# Patient Record
Sex: MALE | Race: WHITE | NOT HISPANIC OR LATINO | Employment: PART TIME | URBAN - METROPOLITAN AREA
[De-identification: names, ages, dates, MRNs, and addresses within clinical notes are randomized per-mention and may not be internally consistent; named-entity substitution may affect disease eponyms.]

---

## 2022-05-20 ENCOUNTER — MEDICAL CORRESPONDENCE (OUTPATIENT)
Dept: HEALTH INFORMATION MANAGEMENT | Facility: CLINIC | Age: 77
End: 2022-05-20
Payer: MEDICARE

## 2022-05-23 ENCOUNTER — TRANSCRIBE ORDERS (OUTPATIENT)
Dept: OTHER | Age: 77
End: 2022-05-23

## 2022-05-23 DIAGNOSIS — D03.39 MELANOMA IN SITU OF NOSE (H): Primary | ICD-10-CM

## 2022-05-24 NOTE — TELEPHONE ENCOUNTER
FUTURE VISIT INFORMATION      FUTURE VISIT INFORMATION:    Date: 5.31.22    Time: 2:30 PM    Location:  Derm Surg  REFERRAL INFORMATION:    Referring provider:  Omero    Referring providers clinic:      Reason for visit/diagnosis  Left nasal sidewall melanoma    RECORDS REQUESTED FROM:       Clinic name Comments Records Status Imaging Status    5.11.22 Omero  Path Case: [id number]  Care Everywhere

## 2022-05-31 ENCOUNTER — PRE VISIT (OUTPATIENT)
Dept: DERMATOLOGY | Facility: CLINIC | Age: 77
End: 2022-05-31
Payer: MEDICARE

## 2022-05-31 ENCOUNTER — OFFICE VISIT (OUTPATIENT)
Dept: DERMATOLOGY | Facility: CLINIC | Age: 77
End: 2022-05-31
Payer: MEDICARE

## 2022-05-31 DIAGNOSIS — D03.39 MELANOMA IN SITU OF NOSE (H): Primary | ICD-10-CM

## 2022-05-31 PROCEDURE — 99204 OFFICE O/P NEW MOD 45 MIN: CPT | Mod: GC | Performed by: DERMATOLOGY

## 2022-05-31 RX ORDER — LORATADINE 10 MG/1
10 TABLET ORAL DAILY
COMMUNITY

## 2022-05-31 RX ORDER — APIXABAN 5 MG/1
TABLET, FILM COATED ORAL
COMMUNITY
Start: 2022-04-28

## 2022-05-31 ASSESSMENT — PAIN SCALES - GENERAL: PAINLEVEL: NO PAIN (0)

## 2022-05-31 NOTE — NURSING NOTE
Chief Complaint   Patient presents with     Derm Problem     L nasal sidewall melanoma      Kasandra SPRINGER, EMT  Dermatology/Dermatology Surgery  [phone]

## 2022-05-31 NOTE — PATIENT INSTRUCTIONS
Mohs Cutaneous Micrographic Surgery Unit  Ashvin Vazquez MD      Pre-Surgical Instruction Sheet    Expect to be here majority of the morning.  The Mohs surgical procedure can be an extensive surgery requiring multiple stages. Each stage may take 1-2 hours.    You may eat breakfast  You may bring snacks or beverages with you  Take all normal medications morning of surgery -- unless otherwise indicated by your physician  If you have an artificial heart valve, or joint replacement within two years, we will prescribe an antibiotic. Please take one hour prior to surgery.    You will need a  to be with you if your surgical site is close to your eyes. You can get swelling/bruising immediately after surgery and will go home with a big bulky bandage that could obstruct your view of driving safely.    Wear comfortable clothing -- preferably a button down shirt or a loose fitted shirt. (to avoid pulling over pressure bandage off when you get home) If your surgery site is on your leg, try wearing loose fitted pants. If your surgery site is on your arm, try wearing a short-sleeve shirt.    Bring reading material or other items to help pass time. We do have internet access available if you own a laptop, iPad, etc.)    Women - do NOT wear make-up. We will be washing it off anyone needing surgery on the face    Do NOT stop blood thinning medication unless instructed to do so by your surgeon. This will include: Warfarin, Coumadin, Jantoven, Aspirin, Plavix and Pradaxa. If you are taking Warfarin or Coumadin, please have your INR blood test results sent to our office no more than 7 days prior to your surgery.     Tylenol is a good alternative to take for headaches and pain, and can be taken throughout your surgery and postoperative recovery.    If your surgery is on your trunk, arms, hands, legs, feet, fingernail or a toenail, please wash the area with Hibiclens, an over-the-counter antiseptic soap, the night before and morning of  your surgery.     If you have any questions, please feel free to contact us.    Phone numbers:  During business hours (M-F 8:00-4:30 p.m.)  [facility]:  [phone] - select option 1 for appt. desk  [phone] - select option 3 for nurse triage line  [facility]:   [phone] - goes straight to call center   ---------------------------------------------------------  Evenings/Weekends/Holidays  Hospital - [phone]   TTY for hearing cuuqzhvl-[phone]  *Ask  to page dermatologist on-call  Emergency Uffq-[phone]  TTY for hearing impaired- [phone]

## 2022-05-31 NOTE — PROGRESS NOTES
Mohs Micrographic Surgery Consult Note    May 31, 2022    Dermatology Problem List:  1. Melanoma in situ, left nasal sidewall, pending MMS    Subjective: The patient is a 77 year old man who presents today for Mohs micrographic surgery consultation for a recent diagnosis of skin cancer.    Skin cancer(s): Melanoma in situ  Location(s): Left nasal sidewall  Associated symptoms: pruritus, tenderness to touch  Onset: within last 1 year    No other associated symptoms, modifying factors, or prior treatments, except when noted above. The patient denies any constitutional symptoms, lymphadenopathy, unintentional weight loss or decreased appetite. No other skin concerns today.    Objective:   Gen: This is a well appearing individual in no acute distress. The patient is alert and oriented x 3.  An exam of the face was performed today and visualized the following:  - At the left nasal sidewall, there is a poorly demarcated hyperpigmented, atrophic plaque corresponding to biopsied skin cancer as above.    Assessment and Plan:     1. Plan for Mohs micrographic surgery for skin cancer(s) above:  - We discussed the nature of the diagnosis/condition above. We discussed the treatment options, including the risks benefits and expectations of these options. We recommend micrographic surgery as the most effective and most tissue sparing option for treatment, and the patient agrees to proceed with this.  The patient is aware of the risks, benefits and expectations of this procedure. The patient will be scheduled for this procedure, if not already done so.  - We anticipate the following closure type: Sliding or lifting flap    The patient was discussed with and evaluated by attending physician, Ashvin Vazquez MD.    Pilo Maya MD  Micrographic Surgery and Dermatologic Oncology (MSDO) Fellow    Scribe Disclosure:  Shobha LUNA, am serving as a scribe to document services personally performed by Ashvin Vazquez MD based on data collection and  the provider's statements to me.

## 2022-05-31 NOTE — LETTER
5/31/2022       RE: Marcus Jiménez  [address] [address]     Dear Colleague,    Thank you for referring your patient, Marcus Jiménez, to the [facility] [place] at [facility]. Please see a copy of my visit note below.    Mohs Micrographic Surgery Consult Note    May 31, 2022    Dermatology Problem List:  1. Melanoma in situ, left nasal sidewall, pending MMS    Subjective: The patient is a 77 year old man who presents today for Mohs micrographic surgery consultation for a recent diagnosis of skin cancer.    Skin cancer(s): Melanoma in situ  Location(s): Left nasal sidewall  Associated symptoms: pruritus, tenderness to touch  Onset: within last 1 year    No other associated symptoms, modifying factors, or prior treatments, except when noted above. The patient denies any constitutional symptoms, lymphadenopathy, unintentional weight loss or decreased appetite. No other skin concerns today.    Objective:   Gen: This is a well appearing individual in no acute distress. The patient is alert and oriented x 3.  An exam of the face was performed today and visualized the following:  - At the left nasal sidewall, there is a poorly demarcated hyperpigmented, atrophic plaque corresponding to biopsied skin cancer as above.    Assessment and Plan:     1. Plan for Mohs micrographic surgery for skin cancer(s) above:  - We discussed the nature of the diagnosis/condition above. We discussed the treatment options, including the risks benefits and expectations of these options. We recommend micrographic surgery as the most effective and most tissue sparing option for treatment, and the patient agrees to proceed with this.  The patient is aware of the risks, benefits and expectations of this procedure. The patient will be scheduled for this procedure, if not already done so.  - We anticipate the following closure type: Sliding or  lifting flap    The patient was discussed with and evaluated by attending physician, Ashvin Vazquez MD.    Pilo Maya MD  Micrographic Surgery and Dermatologic Oncology (MSDO) Fellow    Scribe Disclosure:  I, Shobha Zaman, am serving as a scribe to document services personally performed by Ashvin Vazquez MD based on data collection and the provider's statements to me.       Attestation signed by Ashvin Vazquez MD at 6/7/2022  9:08 AM:  Attending Attestation  I attest that the Fellow recorded the interview and exam that I personally performed.  I have reviewed the note and edited it as necessary.    Ashvin Vazquez M.D.  Professor  Director of Dermatologic Surgery  Department of Dermatology  [facility]

## 2022-06-07 ENCOUNTER — OFFICE VISIT (OUTPATIENT)
Dept: DERMATOLOGY | Facility: CLINIC | Age: 77
End: 2022-06-07
Attending: DERMATOLOGY
Payer: MEDICARE

## 2022-06-07 VITALS — HEART RATE: 79 BPM | DIASTOLIC BLOOD PRESSURE: 97 MMHG | SYSTOLIC BLOOD PRESSURE: 158 MMHG

## 2022-06-07 DIAGNOSIS — D03.39 MELANOMA IN SITU OF NOSE (H): ICD-10-CM

## 2022-06-07 PROCEDURE — 88305 TISSUE EXAM BY PATHOLOGIST: CPT | Mod: 26 | Performed by: DERMATOLOGY

## 2022-06-07 PROCEDURE — 17311 MOHS 1 STAGE H/N/HF/G: CPT | Mod: GC | Performed by: DERMATOLOGY

## 2022-06-07 PROCEDURE — 88342 IMHCHEM/IMCYTCHM 1ST ANTB: CPT | Mod: TC | Performed by: DERMATOLOGY

## 2022-06-07 PROCEDURE — 88342 IMHCHEM/IMCYTCHM 1ST ANTB: CPT | Mod: 26 | Performed by: DERMATOLOGY

## 2022-06-07 PROCEDURE — 14061 TIS TRNFR E/N/E/L10.1-30SQCM: CPT | Mod: GC | Performed by: DERMATOLOGY

## 2022-06-07 NOTE — PATIENT INSTRUCTIONS
Wound care    I will experience scar (unavoidable when the skin is cut with surgery), and may experience bleeding, swelling, pain, crusting, and redness. I may experience incomplete removal (very uncommon) or recurrence (very uncommon). Risks of the procedure are bleeding, bruising, swelling, infection, nerve damage, and a large wound. These occur very uncommonly but are still theoretically possible.    Caring for your skin after surgery    After your surgery, a pressure bandage will be placed over the area that has stitches. This is important to prevent bleeding. Please follow these instructions over the next 1 to 2 weeks. Following this regimen will help to prevent complications as your wound heals and produce the best possible scar over time.    For the first 48 hours after your surgery:    Leave the pressure dressing on and keep it dry. If it should come loose, you may re-tape it, but do not take it off.  Relax and take it easy. Do not do any vigorous exercise or heavy lifting. This could cause the wound to bleed, sutures to break, and the wound to enlarge.  Post-operative pain is usually mild to moderate. Please see the detailed pain medication instructions at the bottom of this message regarding the use of Tylenol (acetaminophen) and ibuprofen.  Avoid alcohol as this may increase your tendency to bleed.  You may put an ice pack around the bandaged area for 20 minutes at a time as needed multiple times daily. This may help reduce swelling, bruising, and pain. Make sure the ice pack is waterproof so that the pressure bandage doesn t get wet.  If your surgical wound is on the face, try your best to sleep with your head elevated. Either in a recliner or propped up in bed with pillows as this will decrease swelling around the eyes, especially when surgery was on the upper face or mid-face.  You may see a small amount of drainage or blood on your pressure bandage. This is normal. However:  If drainage or bleeding  continues or saturates the bandage, you will need to apply firm pressure over the bandage with a piece of gauze, a clean towel, or a waterproof ice pack (an ice pack is best) for 20 minutes continuously without letting up the pressure early. If you let up the pressure earlier than 20 minutes, then the timer restarts.   If after applying continuous pressure for 20 minutes you check and still see active bleeding, then continue for an additional 20 minute period. Waterproof ice packs are best for applying pressure.  If bleeding still continues after two 20-minute periods, call our office or go to the nearest emergency room.    Remove pressure dressing 48 hours after surgery:    Carefully remove the pressure bandage. If it seems sticky or too difficult to get off, you may need to soak it with room temperature water, such as with wet compresses or in the shower.  After the pressure dressing is removed, you may shower and get the wound wet. However, do not let the forceful stream of the direct water pressure from the shower hit the wound directly. This will interfere with wound healing by knocking of skin cells that are migrating into the wound to heal the wound.  Follow these wound care and dressing change instructions:  In the shower, wash the surgical site LAST with its own separate clean wash cloth. Wash everything but the surgical site first, THEN wash the surgical site to avoid contamination from other body areas.  You may allow water to run over the site - just not shooting water on top of the surgical site. Take a clean wash cloth wet with soapy warm water and gently pat the suture site to help remove any crust or drainage that may develop.  Do not rub or scrub the site  After the site is clean, pat dry and apply a thin layer of Vaseline ointment over the suture site with a cotton swab or clean finger.   Cover the suture site with Telfa (non-stick) dressing. You may tape a piece of gauze over the Telfa for extra  "protection if you wish. If you do not have Telfa, then use a clean bandage that is large enough to cover the surgical site without sticky adhesive directly touching the wound or sutures, which would be painful to rip off.  Continue wound care at least once a day, or twice a day if you are active or around a dirty or contaminated environment for work.  Continue daily wound care until your surgical site is completely healed. To determine this, around 2 weeks post procedure, you can take a cotton swab with a small amount of hydrogen peroxide and roll it on the suture site. If the site bubbles and turns white, then your wound is still healing and has not completely sealed shut. Please continue wound care until the area no longer bubbles up from the hydrogen peroxide.  Regarding dissolving stitches: if you have been told your stitches are dissolving (also called \"absorbable sutures\"), they should dissolve in 1-2 weeks. If the absorbable/dissolving stitches do not dissolve by 1-2 weeks, you can use a Q-tip with hydrogen peroxide on it and roll it along the suture line to help the stitches dissolve and come out. Please give us a call if stitches are still present after two weeks. If you do not keep your wound moist at all times, such as with Vaseline or petroleum jelly, while it heals, the dissolving sutures will dry out and not dissolve. They need a moist environment in order to dissolve normally, which is why a coat of Vaseline is important every day.      Follow up is typically a 3-month scar evaluation appointment either in-person in the clinic, or via a telephone visit with you sending in a photo via Instab[employer]. This is unless you have been told to follow up sooner, or if you have concerns and would like to be see sooner for a post-operative issue. Please call or send us a Instabug message with a photo if possible. A Instabug message with a photo is usually the fastest way for us to be able to make an assessment of a " "post-operative issue.        What to expect:    The first couple of days your wound may be tender and may bleed slightly when doing wound care.  There may be swelling and bruising around the wound, especially if it is near the eyes. For surgeries of the upper face or mid-face, \"black eyes\" (bruising within the eye sockets) and swelling around the eyes is very common. For your comfort, you may apply ice or cold compresses to the bruises after your have removed the pressure bandage.  The area around your wound may be numb for several weeks or even months. Nerve endings are typically the slowest thing to regenerate and can take even up to 1 year to completely regenerate. Very, very rarely, patients can report permanent changes in their sensation after surgery.  You may experience periodic sharp pain or mild itching around the wound as it heals.  The suture line will look dark and raised for a while but will lighten and flatten over time. This can take up to 3-6 months.    For post-operative pain control:    If you are able to take acetaminophen (\"Tylenol,\" etc.) and ibuprofen (\"Advil\" or \"Motrin,\" etc.), then you may STAGGER these medications by taking 400 mg of ibuprofen (usually two tabs) every 8 hours and 1,000 mg of acetaminophen (e.g., two tabs of extra-strength Tylenol) every 8 hours.    This means, for example, that you could take the followin,000 mg of Tylenol, followed 4 hours later by 400 mg Ibuprofen, followed 4 hours later with 1,000 mg of Tylenol, followed 4 hours later by 400 mg Ibuprofen, followed 4 hours later with 1,000 mg of Tylenol, and so forth.     Essentially, you can take either 1,000 mg of Tylenol or 400 mg of ibuprofen in alternating fashion EVERY FOUR HOURS.    Do NOT exceed more than 4,000 mg of Tylenol or 3,200 mg of ibuprofen per 24 hours. If you are not able to take Tylenol or ibuprofen as above due to other health issues (or a physician has told you directly that you are not allowed " to take one of them, say due to pre-existing severe liver or kidney issues), then disregard the above directions.    Scientific evidence supports that this combination/schedule of pain medications is just as effective, if not more effective, than taking a narcotic pain medicine.      When to contact us:    You have bleeding that will not stop after applying pressure, including with waterproof ice packs, as above.  You have pain that is not controlled with Tylenol (acetaminophen) and/or ibuprofen.  You have signs or symptoms of an infection, such as:  Fever over 100 degrees Fahrenheit  Redness expanding half an inch past the surgery site, or foul-smelling drainage from the wound  If you have any follow-up questions, or are not sure how to take care of the wound.    Note that [employer] is often the fastest way to contact us, and we recommend registering for [employer] if you are able to and have technology access. You can register for [employer] by going to [employer] or calling [phone] and asking for help with registering.    Phone numbers:    During business hours (M-F 8:00-4:30 p.m.)  [facility]-  [phone] Option 1 appt. desk  [phone]  Option 3 nurse triage line  ---------------------------------------------------------  Evenings/Weekends/Holidays  Hospital - [phone]   TTY for hearing kujrjceq-[phone]  *Ask  to page dermatologist on-call  Emergency Hvpq-[phone]  TTY for hearing impaired- [phone]

## 2022-06-07 NOTE — NURSING NOTE
Chief Complaint   Patient presents with     Derm Problem     Patient is here today for mohs on left nasal side wall.      Jamila HANSEN CMA

## 2022-06-07 NOTE — PROGRESS NOTES
[facility] Mohs Surgery Procedure Note    Case #: [id number]  Date of Service:  Jun 7, 2022  Surgery: Mohs micrographic surgery (MMS)  Staff surgeon: Ashvin Vazquez MD  Fellow surgeon: Pilo Maya MD  Resident surgeon: Clari Kerr MD  Nurse: Jamila Grady CMA    Tumor Type: Melanoma in situ (MIS)  Location: left nasal sidewall  Derm-Path Accession #: outside pathology    Mohs Accession #:   Pre-Op Size: 0.6 cm x 0.6 cm  Final Defect Size: 1.5 cm x 1.7 cm  Number of Mohs stages: 1  Level of Defect: Fat  Local anesthetic: 12 mL 1% lidocaine with epinephrine  Repair Type: Bilobed transposition flap  Repair Size: 2.3 x 4.8 cm  Suture Material: 5-0 Monocryl; 5-0 fast absorbing gut    Procedure:    Stage I  We discussed the principles of treatment and most likely complications including scarring, bleeding, infection, swelling, pain, crusting, nerve damage, large wound,  incomplete excision, wound dehiscence,  nerve damage, recurrence, and a second procedure may be recommended to obtain the best cosmetic or functional result.    Informed consent was obtained and the patient underwent the procedure as follows:  The patient was placed supine on the operating table.  The cancer was identified, outlined with a marker, and verified by the patient.  The entire surgical field was prepped with chlorhexidine.  The surgical site was anesthetized using lidocaine with epinephrine.    The area of clinically apparent tumor was excised and sent for permanent sections to rule out invasive melanoma. The peripheral rim of tissue was then surgically excised using a #15 blade and was then transferred onto a specimen sheet maintaining the orientation of the specimen. Hemostasis was obtained using bipolar electrocoagulation. The wound site was then covered with a dressing while the tissue samples were processed for examination.    The excised tissue was transported to the Mohs histology laboratory maintaining the tissue  orientation.  The tissue specimen was relaxed so that the entire surgical margin was in a a single horizontal plane for sectioning and inked for precise mapping.  A precise reference map was drawn to reflect the sectioning of the specimen, colored inking of the margins, and orientation on the patient. The tissue was processed using horizontal sectioning of the base and continuous peripheral margins. Vertical, bread loafed sections were obtained from the central portion of the lesion, prior to being sent for permament sections. A control biopsy at the left preauricular cheek was taken to compare the density and periodicity of melanocytes along the dermal-epidermal junction.     The tissue sections were stained with Hematoxylin and Eosin (H&E), as well as Melanoma antigen recognized by T cells or Melan-A (MART-1) stain. The histopathologic sections were reviewed in conjunction with the reference map.     Total blocks: 3   Total slides:  11    Within the central portion of the lesion, there was increased density and periodicity of atypical-appearing melanocytes with areas of confluence at the epidermis, consistent with diagnosis above.    Was the skin lesion clear at this stage?: Yes, the skin lesion was determined clear at periphery at this stage: There was a relatively normal periodicity and density of melanocytes along the dermal-epidermal junction noted at the peripheral margins, therefore Mohs surgery was complete.    REPAIR: Bilobed Transposition Flap (Nose)    The patient was taken to the operative suite and placed supine on the operating room table.  The wound was identified and infiltrated with 1% lidocaine with epinephrine.  The defect was then cleansed and prepped with chlorhexidine and draped with sterile drapes.  Using a marker, a bilobed transposition flap repair was planned.  The wound edges were then debeveled and the wound was undermined bluntly in all directions. The transposition flap was incised  sharply to the level of the subnasalis muscle.  The flap was undermined from all surrounding tissue. Hemostasis was obtained with electrodessication.  The flap was transposed into the primary defect.  The secondary defect and flap were closed with deep dermal 5-0 Monocryl sutures.  Epidermal tissue was carefully approximated using 5-0 fast absorbing gut in a simple running fashion throughout the length of the flap.  Redundant skin was excised by the triangulation technique and closed in similar fashion.  The wound was cleansed with sterile saline and Vaseline was applied. A sterile non-adherent pressure dressing was placed.  The patient left the operating suite in stable condition.    Follow-up for suture removal: Not applicable as only dissolving sutures used    Ashvin Vazquez MD was immediately available for the entire surgery and was physicially present for the key portions of the procedure.    Dr. Pilo Maya (Mohs micrographic surgery fellow) performed the Mohs micrographic surgery and reconstruction under the direct supervision of Ashvin Vazquez MD, who was present for the entire micrographic surgery and key portions of the reconstruction, and always immediately available.    Scribe Disclosure:  I, Navarro Zacarias, am serving as a scribe to document services personally performed by Ashvin Vazquez MD based on data collection and the provider's statements to me.       Attending attestation:  I was present for key elements of the procedure and immediately available for all other portions of the procedure.  I have reviewed the note and edited it as necessary.    Ashvin Vazquez M.D.  Professor  Director of Dermatologic Surgery  Department of Dermatology  [facility]    Dermatology Surgery Clinic  [facility] [place] [phone]

## 2022-06-09 LAB
PATH REPORT.COMMENTS IMP SPEC: NORMAL
PATH REPORT.FINAL DX SPEC: NORMAL
PATH REPORT.GROSS SPEC: NORMAL
PATH REPORT.MICROSCOPIC SPEC OTHER STN: NORMAL
PATH REPORT.RELEVANT HX SPEC: NORMAL